# Patient Record
Sex: MALE | Race: BLACK OR AFRICAN AMERICAN | NOT HISPANIC OR LATINO
[De-identification: names, ages, dates, MRNs, and addresses within clinical notes are randomized per-mention and may not be internally consistent; named-entity substitution may affect disease eponyms.]

---

## 2019-12-02 ENCOUNTER — APPOINTMENT (OUTPATIENT)
Dept: HEART AND VASCULAR | Facility: CLINIC | Age: 69
End: 2019-12-02
Payer: MEDICARE

## 2019-12-02 ENCOUNTER — NON-APPOINTMENT (OUTPATIENT)
Age: 69
End: 2019-12-02

## 2019-12-02 VITALS
DIASTOLIC BLOOD PRESSURE: 80 MMHG | SYSTOLIC BLOOD PRESSURE: 145 MMHG | HEIGHT: 74 IN | BODY MASS INDEX: 27.72 KG/M2 | WEIGHT: 216 LBS

## 2019-12-02 DIAGNOSIS — E11.9 TYPE 2 DIABETES MELLITUS W/OUT COMPLICATIONS: ICD-10-CM

## 2019-12-02 PROCEDURE — 93308 TTE F-UP OR LMTD: CPT | Mod: 59

## 2019-12-02 PROCEDURE — 93321 DOPPLER ECHO F-UP/LMTD STD: CPT | Mod: 59

## 2019-12-02 PROCEDURE — 93000 ELECTROCARDIOGRAM COMPLETE: CPT

## 2019-12-02 PROCEDURE — 93325 DOPPLER ECHO COLOR FLOW MAPG: CPT | Mod: 59

## 2019-12-02 PROCEDURE — 93306 TTE W/DOPPLER COMPLETE: CPT

## 2019-12-02 PROCEDURE — 99205 OFFICE O/P NEW HI 60 MIN: CPT

## 2019-12-02 NOTE — HISTORY OF PRESENT ILLNESS
[FreeTextEntry1] : Patient is a 69-year-old black male with a history of diet-controlled diabetes mellitus who underwent what appears to be an arthroscopic knee procedure in March of 2018 which was complicated by postoperative atrial fibrillation patient was placed on a host of cardiac medications at that time including anticoagulation and heart failure medications which he took up until about July of this past year when he saw cardiologist locally who recommended him stopping those medications are he he enjoys generally pretty good exercise capacity walking regularly with no symptom limitations of chest pain shortness of breath arrhythmias syncope or near syncope. Patient today presents for second opinion\par Patient underwent a cardiac evaluation in in March of 2018 for the new onset of atrial fibrillation was found to have depressed LV function and underwent a cardiac catheterization which revealed an EF of 26-30% with no evidence of coronary artery disease no clear cardiology of the heart failure was identified

## 2019-12-02 NOTE — ASSESSMENT
[FreeTextEntry1] : Impression \par PAF with rate controlled on resting EKG \par LVEF appears depressed on echo 40-45% with 1-2 + MR \par will resume eliquis 5 bid form embolic protection and resume amio 400 bid x 7 days then 200 mg qd as maintenance dose\par I am assuming drop in EF is due to PAF as prior Cath revealed no CAD \par If Holter reveals rapid rates would add beta blocker \par Consider TEEECV if AF persists\par

## 2019-12-02 NOTE — PHYSICAL EXAM
[General Appearance - Well Developed] : well developed [Normal Appearance] : normal appearance [Well Groomed] : well groomed [General Appearance - Well Nourished] : well nourished [No Deformities] : no deformities [General Appearance - In No Acute Distress] : no acute distress [Normal Conjunctiva] : the conjunctiva exhibited no abnormalities [Eyelids - No Xanthelasma] : the eyelids demonstrated no xanthelasmas [Normal Oral Mucosa] : normal oral mucosa [No Oral Cyanosis] : no oral cyanosis [No Oral Pallor] : no oral pallor [Normal Jugular Venous A Waves Present] : normal jugular venous A waves present [Normal Jugular Venous V Waves Present] : normal jugular venous V waves present [No Jugular Venous Gaines A Waves] : no jugular venous gaines A waves [Heart Rate And Rhythm] : heart rate and rhythm were normal [Heart Sounds] : normal S1 and S2 [Murmurs] : no murmurs present [Respiration, Rhythm And Depth] : normal respiratory rhythm and effort [Auscultation Breath Sounds / Voice Sounds] : lungs were clear to auscultation bilaterally [Exaggerated Use Of Accessory Muscles For Inspiration] : no accessory muscle use [Abdomen Soft] : soft [Abdomen Tenderness] : non-tender [Abdomen Mass (___ Cm)] : no abdominal mass palpated [Abnormal Walk] : normal gait [Gait - Sufficient For Exercise Testing] : the gait was sufficient for exercise testing [Nail Clubbing] : no clubbing of the fingernails [Cyanosis, Localized] : no localized cyanosis [Petechial Hemorrhages (___cm)] : no petechial hemorrhages [] : no ischemic changes

## 2019-12-04 ENCOUNTER — APPOINTMENT (OUTPATIENT)
Dept: HEART AND VASCULAR | Facility: CLINIC | Age: 69
End: 2019-12-04

## 2020-01-09 ENCOUNTER — APPOINTMENT (OUTPATIENT)
Dept: HEART AND VASCULAR | Facility: CLINIC | Age: 70
End: 2020-01-09
Payer: MEDICARE

## 2020-01-09 ENCOUNTER — NON-APPOINTMENT (OUTPATIENT)
Age: 70
End: 2020-01-09

## 2020-01-09 VITALS
WEIGHT: 211 LBS | HEART RATE: 55 BPM | BODY MASS INDEX: 27.08 KG/M2 | SYSTOLIC BLOOD PRESSURE: 138 MMHG | HEIGHT: 74 IN | DIASTOLIC BLOOD PRESSURE: 82 MMHG

## 2020-01-09 PROCEDURE — 93000 ELECTROCARDIOGRAM COMPLETE: CPT

## 2020-01-09 PROCEDURE — 99214 OFFICE O/P EST MOD 30 MIN: CPT

## 2020-01-09 RX ORDER — AMIODARONE HYDROCHLORIDE 200 MG/1
200 TABLET ORAL
Qty: 28 | Refills: 0 | Status: DISCONTINUED | COMMUNITY
Start: 2019-12-02 | End: 2020-01-09

## 2020-01-09 RX ORDER — AMIODARONE HYDROCHLORIDE 200 MG/1
200 TABLET ORAL DAILY
Qty: 90 | Refills: 3 | Status: DISCONTINUED | COMMUNITY
Start: 2019-12-02 | End: 2020-01-09

## 2020-01-09 NOTE — PHYSICAL EXAM
[General Appearance - Well Developed] : well developed [Normal Appearance] : normal appearance [Well Groomed] : well groomed [General Appearance - Well Nourished] : well nourished [No Deformities] : no deformities [General Appearance - In No Acute Distress] : no acute distress [Normal Conjunctiva] : the conjunctiva exhibited no abnormalities [Eyelids - No Xanthelasma] : the eyelids demonstrated no xanthelasmas [Normal Oral Mucosa] : normal oral mucosa [No Oral Pallor] : no oral pallor [No Oral Cyanosis] : no oral cyanosis [Normal Jugular Venous A Waves Present] : normal jugular venous A waves present [Normal Jugular Venous V Waves Present] : normal jugular venous V waves present [No Jugular Venous Gaines A Waves] : no jugular venous gaines A waves [Respiration, Rhythm And Depth] : normal respiratory rhythm and effort [Exaggerated Use Of Accessory Muscles For Inspiration] : no accessory muscle use [Auscultation Breath Sounds / Voice Sounds] : lungs were clear to auscultation bilaterally [Irregularly Irregular] : irregularly irregular [Normal S1] : normal S1 [Normal S2] : normal S2 [II] : a grade 2 [Abdomen Soft] : soft [Abdomen Tenderness] : non-tender [Abdomen Mass (___ Cm)] : no abdominal mass palpated [Abnormal Walk] : normal gait [Gait - Sufficient For Exercise Testing] : the gait was sufficient for exercise testing [Nail Clubbing] : no clubbing of the fingernails [Cyanosis, Localized] : no localized cyanosis [Petechial Hemorrhages (___cm)] : no petechial hemorrhages [Skin Color & Pigmentation] : normal skin color and pigmentation [] : no rash [No Venous Stasis] : no venous stasis [Skin Lesions] : no skin lesions [No Skin Ulcers] : no skin ulcer [No Xanthoma] : no  xanthoma was observed

## 2020-01-09 NOTE — HISTORY OF PRESENT ILLNESS
[FreeTextEntry1] : Walks up to 2 mils at medium pace with no sob or palpitaions \par Has reduced LVEF on echo with chronic afib \par Holter revealsperiods of rapid rate (chronic afib)\par Afib has been chronic likley since 3/18 \par Wife notes heavy snoring and patient has daytime fatique

## 2020-01-09 NOTE — ASSESSMENT
[FreeTextEntry1] : Impression \par Chronic AFIB possiblly related to underlying myopathy or MARIUSZ ( heavy snorer) \par Low likelyhood of conversion with meds or CV and as patient is asymptomatic will stop amio and add coreg 12.5 bid for rate control and reduced LVEF \par Consider Sleep study to assess MARIUSZ \par Eliquis will continue at 5 mg bid

## 2020-05-21 ENCOUNTER — APPOINTMENT (OUTPATIENT)
Dept: HEART AND VASCULAR | Facility: CLINIC | Age: 70
End: 2020-05-21
Payer: MEDICARE

## 2020-05-21 ENCOUNTER — NON-APPOINTMENT (OUTPATIENT)
Age: 70
End: 2020-05-21

## 2020-05-21 VITALS — SYSTOLIC BLOOD PRESSURE: 125 MMHG | DIASTOLIC BLOOD PRESSURE: 80 MMHG

## 2020-05-21 VITALS — HEIGHT: 74 IN | BODY MASS INDEX: 26.95 KG/M2 | WEIGHT: 210 LBS

## 2020-05-21 PROCEDURE — 99214 OFFICE O/P EST MOD 30 MIN: CPT

## 2020-05-21 PROCEDURE — 93000 ELECTROCARDIOGRAM COMPLETE: CPT

## 2020-05-21 NOTE — HISTORY OF PRESENT ILLNESS
[FreeTextEntry1] : Walks up to 2 mils at medium pace with no sob or palpitaions \par Has reduced LVEF on echo with chronic afib \par Holter revealsperiods of rapid rate (chronic afib)\par Afib has been chronic likley since 3/18 \par Wife notes heavy snoring and patient has daytime fatique \par 5/21/2020\par no effort based symptoms compliant w meds no bleeding

## 2020-05-21 NOTE — PHYSICAL EXAM
[Normal Appearance] : normal appearance [General Appearance - Well Developed] : well developed [Well Groomed] : well groomed [General Appearance - Well Nourished] : well nourished [No Deformities] : no deformities [General Appearance - In No Acute Distress] : no acute distress [Normal Conjunctiva] : the conjunctiva exhibited no abnormalities [Eyelids - No Xanthelasma] : the eyelids demonstrated no xanthelasmas [Normal Oral Mucosa] : normal oral mucosa [No Oral Cyanosis] : no oral cyanosis [No Oral Pallor] : no oral pallor [Normal Jugular Venous V Waves Present] : normal jugular venous V waves present [Normal Jugular Venous A Waves Present] : normal jugular venous A waves present [No Jugular Venous Gaines A Waves] : no jugular venous gaines A waves [Respiration, Rhythm And Depth] : normal respiratory rhythm and effort [Exaggerated Use Of Accessory Muscles For Inspiration] : no accessory muscle use [Auscultation Breath Sounds / Voice Sounds] : lungs were clear to auscultation bilaterally [Abdomen Soft] : soft [Abdomen Tenderness] : non-tender [Abdomen Mass (___ Cm)] : no abdominal mass palpated [Abnormal Walk] : normal gait [Gait - Sufficient For Exercise Testing] : the gait was sufficient for exercise testing [Nail Clubbing] : no clubbing of the fingernails [Cyanosis, Localized] : no localized cyanosis [Petechial Hemorrhages (___cm)] : no petechial hemorrhages [Skin Color & Pigmentation] : normal skin color and pigmentation [] : no rash [No Venous Stasis] : no venous stasis [Skin Lesions] : no skin lesions [No Skin Ulcers] : no skin ulcer [Irregularly Irregular] : irregularly irregular [No Xanthoma] : no  xanthoma was observed [Normal S1] : normal S1 [Normal S2] : normal S2 [II] : a grade 2

## 2020-05-21 NOTE — ASSESSMENT
[FreeTextEntry1] : Impression \par Chronic AFIB possiblly related to underlying myopathy or MARIUSZ ( heavy snorer) \par Low likelihood of conversion with meds or CV and as patient is asymptomatic will stop amio and add coreg 12.5 bid for rate control and reduced LVEF \par Consider Sleep study to assess MARIUSZ \par Eliquis will continue at 5 mg bid

## 2020-12-03 ENCOUNTER — NON-APPOINTMENT (OUTPATIENT)
Age: 70
End: 2020-12-03

## 2020-12-03 ENCOUNTER — APPOINTMENT (OUTPATIENT)
Dept: HEART AND VASCULAR | Facility: CLINIC | Age: 70
End: 2020-12-03
Payer: MEDICARE

## 2020-12-03 VITALS
BODY MASS INDEX: 27.59 KG/M2 | HEIGHT: 74 IN | DIASTOLIC BLOOD PRESSURE: 82 MMHG | HEART RATE: 71 BPM | WEIGHT: 215 LBS | SYSTOLIC BLOOD PRESSURE: 130 MMHG

## 2020-12-03 PROCEDURE — 36415 COLL VENOUS BLD VENIPUNCTURE: CPT

## 2020-12-03 PROCEDURE — 93880 EXTRACRANIAL BILAT STUDY: CPT

## 2020-12-03 PROCEDURE — 99215 OFFICE O/P EST HI 40 MIN: CPT

## 2020-12-03 PROCEDURE — 93000 ELECTROCARDIOGRAM COMPLETE: CPT

## 2020-12-03 PROCEDURE — 93306 TTE W/DOPPLER COMPLETE: CPT

## 2020-12-03 NOTE — ASSESSMENT
[FreeTextEntry1] : Impression \par Lvef 40%  on echo GH Dilated LV \par will add ACE due to ext fatigue and CLAY \par Has been NON compliant with eliquis despite high risk of CVA in AFIB \par Will get ETT MIBI to quantify effort tolerance \par Carotid minimal disease \par Medical complaince urged \par declined Flu vaccine \par

## 2020-12-03 NOTE — PHYSICAL EXAM
[General Appearance - Well Developed] : well developed [Normal Appearance] : normal appearance [Well Groomed] : well groomed [General Appearance - Well Nourished] : well nourished [No Deformities] : no deformities [General Appearance - In No Acute Distress] : no acute distress [Normal Conjunctiva] : the conjunctiva exhibited no abnormalities [Eyelids - No Xanthelasma] : the eyelids demonstrated no xanthelasmas [Normal Oral Mucosa] : normal oral mucosa [No Oral Pallor] : no oral pallor [No Oral Cyanosis] : no oral cyanosis [Normal Jugular Venous A Waves Present] : normal jugular venous A waves present [Normal Jugular Venous V Waves Present] : normal jugular venous V waves present [No Jugular Venous Gaines A Waves] : no jugular venous gaines A waves [Respiration, Rhythm And Depth] : normal respiratory rhythm and effort [Exaggerated Use Of Accessory Muscles For Inspiration] : no accessory muscle use [Auscultation Breath Sounds / Voice Sounds] : lungs were clear to auscultation bilaterally [Abdomen Soft] : soft [Abdomen Tenderness] : non-tender [Abdomen Mass (___ Cm)] : no abdominal mass palpated [Abnormal Walk] : normal gait [Gait - Sufficient For Exercise Testing] : the gait was sufficient for exercise testing [Nail Clubbing] : no clubbing of the fingernails [Cyanosis, Localized] : no localized cyanosis [Petechial Hemorrhages (___cm)] : no petechial hemorrhages [Skin Color & Pigmentation] : normal skin color and pigmentation [] : no rash [No Venous Stasis] : no venous stasis [Skin Lesions] : no skin lesions [No Skin Ulcers] : no skin ulcer [No Xanthoma] : no  xanthoma was observed [Irregularly Irregular] : irregularly irregular [Normal S1] : normal S1 [Normal S2] : normal S2 [II] : a grade 2

## 2020-12-04 LAB
ANION GAP SERPL CALC-SCNC: 9 MMOL/L
BUN SERPL-MCNC: 17 MG/DL
CALCIUM SERPL-MCNC: 8.6 MG/DL
CHLORIDE SERPL-SCNC: 105 MMOL/L
CO2 SERPL-SCNC: 28 MMOL/L
CREAT SERPL-MCNC: 0.98 MG/DL
GLUCOSE SERPL-MCNC: 102 MG/DL
NT-PROBNP SERPL-MCNC: 1645 PG/ML
POTASSIUM SERPL-SCNC: 4.6 MMOL/L
SODIUM SERPL-SCNC: 142 MMOL/L

## 2020-12-21 ENCOUNTER — APPOINTMENT (OUTPATIENT)
Dept: HEART AND VASCULAR | Facility: CLINIC | Age: 70
End: 2020-12-21
Payer: MEDICARE

## 2020-12-21 VITALS
SYSTOLIC BLOOD PRESSURE: 150 MMHG | HEART RATE: 66 BPM | HEIGHT: 74 IN | BODY MASS INDEX: 27.59 KG/M2 | WEIGHT: 215 LBS | DIASTOLIC BLOOD PRESSURE: 80 MMHG

## 2020-12-21 PROCEDURE — 78452 HT MUSCLE IMAGE SPECT MULT: CPT

## 2020-12-21 PROCEDURE — 93015 CV STRESS TEST SUPVJ I&R: CPT

## 2020-12-21 PROCEDURE — A9500: CPT

## 2020-12-21 PROCEDURE — 96374 THER/PROPH/DIAG INJ IV PUSH: CPT | Mod: 59

## 2020-12-21 NOTE — PHYSICAL EXAM
[General Appearance - Well Developed] : well developed [Normal Appearance] : normal appearance [Well Groomed] : well groomed [General Appearance - Well Nourished] : well nourished [No Deformities] : no deformities [General Appearance - In No Acute Distress] : no acute distress [Normal Conjunctiva] : the conjunctiva exhibited no abnormalities [Eyelids - No Xanthelasma] : the eyelids demonstrated no xanthelasmas [Normal Oral Mucosa] : normal oral mucosa [No Oral Pallor] : no oral pallor [No Oral Cyanosis] : no oral cyanosis [Normal Jugular Venous A Waves Present] : normal jugular venous A waves present [Normal Jugular Venous V Waves Present] : normal jugular venous V waves present [No Jugular Venous Gaines A Waves] : no jugular venous gaines A waves [] : no respiratory distress [Respiration, Rhythm And Depth] : normal respiratory rhythm and effort [Exaggerated Use Of Accessory Muscles For Inspiration] : no accessory muscle use [Auscultation Breath Sounds / Voice Sounds] : lungs were clear to auscultation bilaterally [Heart Rate And Rhythm] : heart rate and rhythm were normal [Heart Sounds] : normal S1 and S2 [Murmurs] : no murmurs present

## 2020-12-30 ENCOUNTER — APPOINTMENT (OUTPATIENT)
Dept: HEART AND VASCULAR | Facility: CLINIC | Age: 70
End: 2020-12-30
Payer: MEDICARE

## 2020-12-30 ENCOUNTER — NON-APPOINTMENT (OUTPATIENT)
Age: 70
End: 2020-12-30

## 2020-12-30 VITALS
SYSTOLIC BLOOD PRESSURE: 150 MMHG | BODY MASS INDEX: 27.34 KG/M2 | HEART RATE: 78 BPM | DIASTOLIC BLOOD PRESSURE: 100 MMHG | HEIGHT: 74 IN | WEIGHT: 213 LBS

## 2020-12-30 PROCEDURE — 36415 COLL VENOUS BLD VENIPUNCTURE: CPT

## 2020-12-30 PROCEDURE — 93000 ELECTROCARDIOGRAM COMPLETE: CPT

## 2020-12-30 PROCEDURE — 99215 OFFICE O/P EST HI 40 MIN: CPT

## 2020-12-30 NOTE — HISTORY OF PRESENT ILLNESS
[FreeTextEntry1] : Seen here for PAF and non ischemic CM \par Recent stress tets LVEF41% with poor effrt tolerance and exaggerated BP respone \par Has taken up regular walking up to 3 miles on flat ground at brisk pace \par Compliant with BP and NOAC agents \par no orthopnea or pnd

## 2020-12-30 NOTE — ASSESSMENT
[FreeTextEntry1] : Impression \par Bp nees optimization \par inc altace to 10 mg \par add Aldactone \par f/u renal fx and K level \par has only been taking eliquis once per day qod \par despite my expressed concern about risk of CVA \par

## 2020-12-31 LAB
ANION GAP SERPL CALC-SCNC: 12 MMOL/L
BUN SERPL-MCNC: 13 MG/DL
CALCIUM SERPL-MCNC: 8.6 MG/DL
CHLORIDE SERPL-SCNC: 103 MMOL/L
CO2 SERPL-SCNC: 28 MMOL/L
CREAT SERPL-MCNC: 1.06 MG/DL
GLUCOSE SERPL-MCNC: 84 MG/DL
NT-PROBNP SERPL-MCNC: 2192 PG/ML
POTASSIUM SERPL-SCNC: 4.8 MMOL/L
SODIUM SERPL-SCNC: 143 MMOL/L

## 2021-02-24 ENCOUNTER — APPOINTMENT (OUTPATIENT)
Dept: HEART AND VASCULAR | Facility: CLINIC | Age: 71
End: 2021-02-24
Payer: MEDICARE

## 2021-02-24 ENCOUNTER — NON-APPOINTMENT (OUTPATIENT)
Age: 71
End: 2021-02-24

## 2021-02-24 VITALS — SYSTOLIC BLOOD PRESSURE: 140 MMHG | DIASTOLIC BLOOD PRESSURE: 80 MMHG

## 2021-02-24 VITALS
WEIGHT: 206 LBS | DIASTOLIC BLOOD PRESSURE: 90 MMHG | HEIGHT: 74 IN | BODY MASS INDEX: 26.44 KG/M2 | SYSTOLIC BLOOD PRESSURE: 150 MMHG

## 2021-02-24 PROCEDURE — 99214 OFFICE O/P EST MOD 30 MIN: CPT

## 2021-02-24 PROCEDURE — 36415 COLL VENOUS BLD VENIPUNCTURE: CPT

## 2021-02-24 PROCEDURE — 93000 ELECTROCARDIOGRAM COMPLETE: CPT

## 2021-02-24 NOTE — PHYSICAL EXAM
[General Appearance - Well Developed] : well developed [Normal Appearance] : normal appearance [Well Groomed] : well groomed [General Appearance - Well Nourished] : well nourished [No Deformities] : no deformities [General Appearance - In No Acute Distress] : no acute distress [Normal Conjunctiva] : the conjunctiva exhibited no abnormalities [Eyelids - No Xanthelasma] : the eyelids demonstrated no xanthelasmas [Normal Oral Mucosa] : normal oral mucosa [No Oral Pallor] : no oral pallor [No Oral Cyanosis] : no oral cyanosis [Normal Jugular Venous A Waves Present] : normal jugular venous A waves present [Normal Jugular Venous V Waves Present] : normal jugular venous V waves present [No Jugular Venous Gaines A Waves] : no jugular venous gaines A waves [Respiration, Rhythm And Depth] : normal respiratory rhythm and effort [Exaggerated Use Of Accessory Muscles For Inspiration] : no accessory muscle use [Auscultation Breath Sounds / Voice Sounds] : lungs were clear to auscultation bilaterally [Abdomen Tenderness] : non-tender [Abdomen Soft] : soft [Abdomen Mass (___ Cm)] : no abdominal mass palpated [Abnormal Walk] : normal gait [Gait - Sufficient For Exercise Testing] : the gait was sufficient for exercise testing [Nail Clubbing] : no clubbing of the fingernails [Cyanosis, Localized] : no localized cyanosis [Petechial Hemorrhages (___cm)] : no petechial hemorrhages [Skin Color & Pigmentation] : normal skin color and pigmentation [] : no rash [No Venous Stasis] : no venous stasis [Skin Lesions] : no skin lesions [No Skin Ulcers] : no skin ulcer [No Xanthoma] : no  xanthoma was observed [Irregularly Irregular] : irregularly irregular [Normal S1] : normal S1 [Normal S2] : normal S2 [II] : a grade 2

## 2021-02-24 NOTE — ASSESSMENT
[FreeTextEntry1] : Impression \par Bp improved on meds \par still taking eliquis qd NOT bid \par compliance urged\par Vaccin access discussed with pt and wife \par

## 2021-03-02 LAB
ANION GAP SERPL CALC-SCNC: 12 MMOL/L
BUN SERPL-MCNC: 18 MG/DL
CALCIUM SERPL-MCNC: 8.9 MG/DL
CHLORIDE SERPL-SCNC: 106 MMOL/L
CO2 SERPL-SCNC: 26 MMOL/L
CREAT SERPL-MCNC: 1.13 MG/DL
GLUCOSE SERPL-MCNC: 94 MG/DL
POTASSIUM SERPL-SCNC: 4.6 MMOL/L
SODIUM SERPL-SCNC: 144 MMOL/L

## 2021-03-18 LAB — NT-PROBNP SERPL-MCNC: 2475 PG/ML

## 2021-06-23 ENCOUNTER — NON-APPOINTMENT (OUTPATIENT)
Age: 71
End: 2021-06-23

## 2021-06-23 ENCOUNTER — APPOINTMENT (OUTPATIENT)
Dept: HEART AND VASCULAR | Facility: CLINIC | Age: 71
End: 2021-06-23
Payer: MEDICARE

## 2021-06-23 VITALS
BODY MASS INDEX: 26.69 KG/M2 | SYSTOLIC BLOOD PRESSURE: 120 MMHG | DIASTOLIC BLOOD PRESSURE: 70 MMHG | WEIGHT: 208 LBS | HEIGHT: 74 IN | HEART RATE: 85 BPM

## 2021-06-23 PROCEDURE — 99214 OFFICE O/P EST MOD 30 MIN: CPT

## 2021-06-23 PROCEDURE — 93000 ELECTROCARDIOGRAM COMPLETE: CPT

## 2021-06-23 NOTE — HISTORY OF PRESENT ILLNESS
[FreeTextEntry1] : Seen here for PAF and non ischemic CM \par Recent stress tetst LVEF41% with poor effrt tolerance and exaggerated BP respone \par Has taken up regular walking up to 3 miles on flat ground at brisk pace \par Compliant with BP and NOAC agents \par no orthopnea or pnd \par 2/24/2021\par Feells well with no effort intolerance no edema or PND \par no bleeding issues on NOAC agent \par 6/23/21\par Good effort tolerance no sob or arrhythmia or palpations \par no edema no syncope or near syncope

## 2021-06-23 NOTE — ASSESSMENT
[FreeTextEntry1] : Impression \par Bp improved on meds \par still taking eliquis qd NOT bid \par compliance urged\par \par

## 2021-08-12 ENCOUNTER — NON-APPOINTMENT (OUTPATIENT)
Age: 71
End: 2021-08-12

## 2021-08-12 ENCOUNTER — APPOINTMENT (OUTPATIENT)
Dept: HEART AND VASCULAR | Facility: CLINIC | Age: 71
End: 2021-08-12
Payer: MEDICARE

## 2021-08-12 VITALS
WEIGHT: 203 LBS | HEART RATE: 79 BPM | BODY MASS INDEX: 26.05 KG/M2 | SYSTOLIC BLOOD PRESSURE: 118 MMHG | DIASTOLIC BLOOD PRESSURE: 75 MMHG | HEIGHT: 74 IN

## 2021-08-12 VITALS — OXYGEN SATURATION: 98 %

## 2021-08-12 PROCEDURE — 99214 OFFICE O/P EST MOD 30 MIN: CPT

## 2021-08-12 PROCEDURE — 36415 COLL VENOUS BLD VENIPUNCTURE: CPT

## 2021-08-12 PROCEDURE — 93000 ELECTROCARDIOGRAM COMPLETE: CPT

## 2021-08-12 NOTE — HISTORY OF PRESENT ILLNESS
[FreeTextEntry1] : Seen by PMD for nasal congestion and observed by family to have sob \par No leg edema or wgt gain \par has some PND but patient says its due to nasal congestion\par Cxr by report neg for CHF or pneumonia\par

## 2021-08-12 NOTE — REVIEW OF SYSTEMS
[Headache] : headache [Feeling Fatigued] : feeling fatigued [Sinus Pressure] : sinus pressure [SOB] : shortness of breath [Dyspnea on exertion] : dyspnea during exertion [Negative] : Gastrointestinal [Lower Ext Edema] : no extremity edema [Leg Claudication] : no intermittent leg claudication [Syncope] : no syncope [Cough] : no cough

## 2021-08-12 NOTE — PHYSICAL EXAM
[Well Developed] : well developed [Well Nourished] : well nourished [No Acute Distress] : no acute distress [Normal Conjunctiva] : normal conjunctiva [Normal Venous Pressure] : normal venous pressure [No Carotid Bruit] : no carotid bruit [5th Left ICS - MCL] : palpated at the 5th LICS in the midclavicular line [Irregularly Irregular] : irregularly irregular [Normal S1] : normal S1 [Normal S2] : normal S2 [II] : a grade 2 [No Pitting Edema] : no pitting edema present [Clear Lung Fields] : clear lung fields [Good Air Entry] : good air entry [No Respiratory Distress] : no respiratory distress  [Soft] : abdomen soft [Non Tender] : non-tender [No Masses/organomegaly] : no masses/organomegaly [Normal Bowel Sounds] : normal bowel sounds [Normal Gait] : normal gait [No Edema] : no edema [No Cyanosis] : no cyanosis [No Clubbing] : no clubbing [No Varicosities] : no varicosities [No Rash] : no rash [No Skin Lesions] : no skin lesions [Moves all extremities] : moves all extremities [No Focal Deficits] : no focal deficits [Normal Speech] : normal speech [Alert and Oriented] : alert and oriented [Normal memory] : normal memory

## 2021-08-13 LAB — NT-PROBNP SERPL-MCNC: 3908 PG/ML

## 2021-12-14 ENCOUNTER — APPOINTMENT (OUTPATIENT)
Dept: HEART AND VASCULAR | Facility: CLINIC | Age: 71
End: 2021-12-14
Payer: MEDICARE

## 2021-12-14 ENCOUNTER — NON-APPOINTMENT (OUTPATIENT)
Age: 71
End: 2021-12-14

## 2021-12-14 VITALS
DIASTOLIC BLOOD PRESSURE: 80 MMHG | HEIGHT: 74 IN | SYSTOLIC BLOOD PRESSURE: 125 MMHG | WEIGHT: 200 LBS | HEART RATE: 101 BPM | BODY MASS INDEX: 25.67 KG/M2

## 2021-12-14 DIAGNOSIS — Z91.19 PATIENT'S NONCOMPLIANCE WITH OTHER MEDICAL TREATMENT AND REGIMEN: ICD-10-CM

## 2021-12-14 PROCEDURE — 99215 OFFICE O/P EST HI 40 MIN: CPT

## 2021-12-14 PROCEDURE — 93000 ELECTROCARDIOGRAM COMPLETE: CPT

## 2021-12-14 PROCEDURE — 93306 TTE W/DOPPLER COMPLETE: CPT

## 2021-12-14 PROCEDURE — ZZZZZ: CPT

## 2021-12-14 NOTE — ASSESSMENT
[FreeTextEntry1] : progressive sob w edema on exam c/w decompensated CHF \par has had mixed compliance w diuretics and ACE meds \par \par

## 2021-12-14 NOTE — HISTORY OF PRESENT ILLNESS
[FreeTextEntry1] : Seen by PMD for nasal congestion and observed by family to have sob \par worsening edema and sob no orthopnea or pnd \par no cough \par has periodic gerd pain \par poor compliance w meds \par has MARIUSZ based on sleep study is awaiting CPAP

## 2021-12-15 LAB
ANION GAP SERPL CALC-SCNC: 13 MMOL/L
BUN SERPL-MCNC: 32 MG/DL
CALCIUM SERPL-MCNC: 8.7 MG/DL
CHLORIDE SERPL-SCNC: 104 MMOL/L
CO2 SERPL-SCNC: 27 MMOL/L
CREAT SERPL-MCNC: 1.34 MG/DL
GLUCOSE SERPL-MCNC: 103 MG/DL
NT-PROBNP SERPL-MCNC: 7797 PG/ML
POTASSIUM SERPL-SCNC: 3.7 MMOL/L
SODIUM SERPL-SCNC: 144 MMOL/L

## 2021-12-28 ENCOUNTER — NON-APPOINTMENT (OUTPATIENT)
Age: 71
End: 2021-12-28

## 2021-12-28 ENCOUNTER — APPOINTMENT (OUTPATIENT)
Dept: HEART AND VASCULAR | Facility: CLINIC | Age: 71
End: 2021-12-28
Payer: MEDICARE

## 2021-12-28 VITALS
HEIGHT: 74 IN | DIASTOLIC BLOOD PRESSURE: 90 MMHG | WEIGHT: 187 LBS | HEART RATE: 97 BPM | BODY MASS INDEX: 24 KG/M2 | SYSTOLIC BLOOD PRESSURE: 130 MMHG

## 2021-12-28 PROCEDURE — 36415 COLL VENOUS BLD VENIPUNCTURE: CPT

## 2021-12-28 PROCEDURE — 99214 OFFICE O/P EST MOD 30 MIN: CPT

## 2021-12-28 PROCEDURE — 93000 ELECTROCARDIOGRAM COMPLETE: CPT

## 2021-12-28 RX ORDER — RAMIPRIL 10 MG/1
10 CAPSULE ORAL DAILY
Qty: 90 | Refills: 3 | Status: DISCONTINUED | COMMUNITY
Start: 2020-12-03 | End: 2021-12-28

## 2021-12-28 NOTE — ASSESSMENT
[FreeTextEntry1] : CHF improved on meds \par will add entresto in liue of ACE \par bnp pending w sma7 \par \par

## 2021-12-28 NOTE — HISTORY OF PRESENT ILLNESS
[FreeTextEntry1] : 12/14/21    Seen by PMD for nasal congestion and observed by family to have sob \par worsening edema and sob no orthopnea or pnd \par no cough \par has periodic gerd pain \par poor compliance w meds \par has MARIUSZ based on sleep study is awaiting CPAP\par 12/28/21\par  has diuresed 13 lbs with improvement in dypnea \par no edema at present

## 2021-12-28 NOTE — PHYSICAL EXAM
[Well Developed] : well developed [Well Nourished] : well nourished [No Acute Distress] : no acute distress [Normal Conjunctiva] : normal conjunctiva [Normal Venous Pressure] : normal venous pressure [No Carotid Bruit] : no carotid bruit [5th Left ICS - MCL] : palpated at the 5th LICS in the midclavicular line [Irregularly Irregular] : irregularly irregular [Normal S1] : normal S1 [Normal S2] : normal S2 [II] : a grade 2 [No Pitting Edema] : no pitting edema present [Clear Lung Fields] : clear lung fields [Good Air Entry] : good air entry [No Respiratory Distress] : no respiratory distress  [Soft] : abdomen soft [Non Tender] : non-tender [No Masses/organomegaly] : no masses/organomegaly [Normal Bowel Sounds] : normal bowel sounds [Normal Gait] : normal gait [No Cyanosis] : no cyanosis [No Clubbing] : no clubbing [No Varicosities] : no varicosities [No Rash] : no rash [No Skin Lesions] : no skin lesions [Moves all extremities] : moves all extremities [No Focal Deficits] : no focal deficits [Normal Speech] : normal speech [Alert and Oriented] : alert and oriented [Normal memory] : normal memory

## 2021-12-29 LAB
ANION GAP SERPL CALC-SCNC: 15 MMOL/L
BUN SERPL-MCNC: 27 MG/DL
CALCIUM SERPL-MCNC: 8.6 MG/DL
CHLORIDE SERPL-SCNC: 103 MMOL/L
CO2 SERPL-SCNC: 26 MMOL/L
CREAT SERPL-MCNC: 1.15 MG/DL
GLUCOSE SERPL-MCNC: 83 MG/DL
NT-PROBNP SERPL-MCNC: 5561 PG/ML
POTASSIUM SERPL-SCNC: 3.6 MMOL/L
SODIUM SERPL-SCNC: 144 MMOL/L

## 2022-02-28 ENCOUNTER — NON-APPOINTMENT (OUTPATIENT)
Age: 72
End: 2022-02-28

## 2022-02-28 ENCOUNTER — APPOINTMENT (OUTPATIENT)
Dept: HEART AND VASCULAR | Facility: CLINIC | Age: 72
End: 2022-02-28
Payer: MEDICARE

## 2022-02-28 VITALS
HEART RATE: 87 BPM | DIASTOLIC BLOOD PRESSURE: 85 MMHG | WEIGHT: 183 LBS | HEIGHT: 74 IN | SYSTOLIC BLOOD PRESSURE: 120 MMHG | BODY MASS INDEX: 23.49 KG/M2

## 2022-02-28 DIAGNOSIS — G47.33 OBSTRUCTIVE SLEEP APNEA (ADULT) (PEDIATRIC): ICD-10-CM

## 2022-02-28 PROCEDURE — 99214 OFFICE O/P EST MOD 30 MIN: CPT

## 2022-02-28 PROCEDURE — 36415 COLL VENOUS BLD VENIPUNCTURE: CPT

## 2022-02-28 PROCEDURE — 93000 ELECTROCARDIOGRAM COMPLETE: CPT

## 2022-02-28 NOTE — DISCUSSION/SUMMARY
[Atrial Fibrillation] : atrial fibrillation [Controlled Ventricular Response] : controlled ventricular response [Stable] : stable [Congestive Heart Failure] : congestive heart failure [Improving] : improving [Responding to Treatment] : responding to treatment [Basic Metabolic Panel] : basic metabolic panel [BNP] : B-type natriuretic peptide [ICD] : an implantable cardioverter-defibrillator [Patient] : the patient [With Me] : with me [___ Month(s)] : in [unfilled] month(s) [de-identified] : salt restriction avoid nsaids  [FreeTextEntry2] : spouse

## 2022-02-28 NOTE — HISTORY OF PRESENT ILLNESS
[FreeTextEntry1] : 12/14/21    Seen by PMD for nasal congestion and observed by family to have sob \par worsening edema and sob no orthopnea or pnd \par no cough \par has periodic gerd pain \par poor compliance w meds \par has MARIUSZ based on sleep study is awaiting CPAP\par 12/28/21\par  has diuresed 13 lbs with improvement in dypnea \par no edema at present \par 2/28/22\par feels better w entresto \par takes diurtetic qod \par better effort capacity no orthopnea or pnd

## 2022-03-01 LAB
ANION GAP SERPL CALC-SCNC: 12 MMOL/L
BUN SERPL-MCNC: 24 MG/DL
CALCIUM SERPL-MCNC: 8.7 MG/DL
CHLORIDE SERPL-SCNC: 106 MMOL/L
CO2 SERPL-SCNC: 27 MMOL/L
CREAT SERPL-MCNC: 1.1 MG/DL
EGFR: 72 ML/MIN/1.73M2
GLUCOSE SERPL-MCNC: 110 MG/DL
NT-PROBNP SERPL-MCNC: 6494 PG/ML
POTASSIUM SERPL-SCNC: 3.8 MMOL/L
SODIUM SERPL-SCNC: 145 MMOL/L

## 2022-04-07 ENCOUNTER — NON-APPOINTMENT (OUTPATIENT)
Age: 72
End: 2022-04-07

## 2022-05-24 ENCOUNTER — NON-APPOINTMENT (OUTPATIENT)
Age: 72
End: 2022-05-24

## 2022-05-24 ENCOUNTER — APPOINTMENT (OUTPATIENT)
Dept: HEART AND VASCULAR | Facility: CLINIC | Age: 72
End: 2022-05-24
Payer: MEDICARE

## 2022-05-24 VITALS
DIASTOLIC BLOOD PRESSURE: 70 MMHG | BODY MASS INDEX: 24.26 KG/M2 | WEIGHT: 189 LBS | HEART RATE: 78 BPM | SYSTOLIC BLOOD PRESSURE: 100 MMHG | HEIGHT: 74 IN

## 2022-05-24 VITALS — SYSTOLIC BLOOD PRESSURE: 120 MMHG | DIASTOLIC BLOOD PRESSURE: 80 MMHG

## 2022-05-24 PROCEDURE — 99214 OFFICE O/P EST MOD 30 MIN: CPT

## 2022-05-24 PROCEDURE — 93000 ELECTROCARDIOGRAM COMPLETE: CPT

## 2022-05-24 NOTE — DISCUSSION/SUMMARY
[Atrial Fibrillation] : atrial fibrillation [Controlled Ventricular Response] : controlled ventricular response [Stable] : stable [Congestive Heart Failure] : congestive heart failure [Improving] : improving [Responding to Treatment] : responding to treatment [Basic Metabolic Panel] : basic metabolic panel [BNP] : B-type natriuretic peptide [Continue] : continuing aspirin [Sodium Restriction] : sodium restriction [ICD] : an implantable cardioverter-defibrillator [Patient] : the patient [With Me] : with me [___ Month(s)] : in [unfilled] month(s) [de-identified] : stop eliquis 3 days preop  [de-identified] : salt restriction avoid nsaids  [FreeTextEntry2] : spouse

## 2022-05-24 NOTE — ASSESSMENT
[FreeTextEntry1] : \par \par Cleared for planned hernia reapir \par HOLD eliquis 6 doses prior to surgery

## 2022-05-24 NOTE — HISTORY OF PRESENT ILLNESS
[FreeTextEntry1] : 12/14/21    Seen by PMD for nasal congestion and observed by family to have sob \par worsening edema and sob no orthopnea or pnd \par no cough \par has periodic gerd pain \par poor compliance w meds \par has MARIUSZ based on sleep study is awaiting CPAP\par 12/28/21\par  has diuresed 13 lbs with improvement in dypnea \par no edema at present \par 2/28/22\par feels better w entresto \par takes diurtetic qod \par better effort capacity no orthopnea or pnd \par 5/23/22\par Patient is a 8-year-old male followed here for nonischemic cardiomyopathy as well as chronic atrial fibrillation he has been well maintained on standard heart failure medications including Aldactone diuretics Eliquis for stroke prophylaxis.  He is currently preop for a hernia repair.  He has had no recent symptoms of chest pain shortness of breath or leg edema\par wgt stable no orthopnea

## 2022-09-21 ENCOUNTER — APPOINTMENT (OUTPATIENT)
Dept: HEART AND VASCULAR | Facility: CLINIC | Age: 72
End: 2022-09-21

## 2022-09-21 ENCOUNTER — NON-APPOINTMENT (OUTPATIENT)
Age: 72
End: 2022-09-21

## 2022-09-21 VITALS
BODY MASS INDEX: 22.72 KG/M2 | DIASTOLIC BLOOD PRESSURE: 80 MMHG | HEIGHT: 74 IN | WEIGHT: 177 LBS | SYSTOLIC BLOOD PRESSURE: 122 MMHG | HEART RATE: 80 BPM

## 2022-09-21 VITALS
DIASTOLIC BLOOD PRESSURE: 81 MMHG | SYSTOLIC BLOOD PRESSURE: 120 MMHG | WEIGHT: 165 LBS | HEIGHT: 74 IN | BODY MASS INDEX: 21.17 KG/M2

## 2022-09-21 PROCEDURE — 99214 OFFICE O/P EST MOD 30 MIN: CPT

## 2022-09-21 PROCEDURE — 93000 ELECTROCARDIOGRAM COMPLETE: CPT

## 2022-09-21 NOTE — ASSESSMENT
[FreeTextEntry1] : \par Assessment \par CHF compensated on meds \par Has ahd excess wgt loss beyond just " water wgt " \par will rec speaking to PMD about CF and blood work \par Cleared for planned hernia reapir \par HOLD eliquis 2 days preop

## 2022-09-21 NOTE — HISTORY OF PRESENT ILLNESS
[FreeTextEntry1] : 12/14/21    Seen by PMD for nasal congestion and observed by family to have sob \par worsening edema and sob no orthopnea or pnd \par no cough \par has periodic gerd pain \par poor compliance w meds \par has MARIUSZ based on sleep study is awaiting CPAP\par 12/28/21\par  has diuresed 13 lbs with improvement in dypnea \par no edema at present \par 2/28/22\par feels better w entresto \par takes diurtetic qod \par better effort capacity no orthopnea or pnd \par 5/23/22\par Patient is a 8-year-old male followed here for nonischemic cardiomyopathy as well as chronic atrial fibrillation he has been well maintained on standard heart failure medications including Aldactone diuretics Eliquis for stroke prophylaxis.  He is currently preop for a hernia repair.  He has had no recent symptoms of chest pain shortness of breath or leg edema\par wgt stable no orthopnea \par 9/21/22\par good effort tolernce \par takes diuretics prn for edma \par has no sob or sscp\par is preop for planned hernia repair \par no edema

## 2022-09-22 LAB — NT-PROBNP SERPL-MCNC: ABNORMAL PG/ML

## 2023-01-03 ENCOUNTER — APPOINTMENT (OUTPATIENT)
Dept: HEART AND VASCULAR | Facility: CLINIC | Age: 73
End: 2023-01-03
Payer: MEDICARE

## 2023-01-18 NOTE — DISCUSSION/SUMMARY
[Atrial Fibrillation] : atrial fibrillation [Controlled Ventricular Response] : controlled ventricular response [Stable] : stable [Congestive Heart Failure] : congestive heart failure [Improving] : improving [Responding to Treatment] : responding to treatment [Basic Metabolic Panel] : basic metabolic panel [BNP] : B-type natriuretic peptide [Continue] : continuing aspirin [Sodium Restriction] : sodium restriction [ICD] : an implantable cardioverter-defibrillator [Patient] : the patient [With Me] : with me [___ Month(s)] : in [unfilled] month(s) [de-identified] : stop eliquis 3 days preop  [de-identified] : salt restriction avoid nsaids  [FreeTextEntry2] : spouse  [EKG obtained to assist in diagnosis and management of assessed problem(s)] : EKG obtained to assist in diagnosis and management of assessed problem(s) Tranexamic Acid Pregnancy And Lactation Text: It is unknown if this medication is safe during pregnancy or breast feeding.

## 2023-01-23 ENCOUNTER — NON-APPOINTMENT (OUTPATIENT)
Age: 73
End: 2023-01-23

## 2023-01-23 ENCOUNTER — APPOINTMENT (OUTPATIENT)
Dept: HEART AND VASCULAR | Facility: CLINIC | Age: 73
End: 2023-01-23
Payer: MEDICARE

## 2023-01-23 VITALS
HEIGHT: 74 IN | WEIGHT: 170 LBS | SYSTOLIC BLOOD PRESSURE: 130 MMHG | BODY MASS INDEX: 21.82 KG/M2 | HEART RATE: 87 BPM | DIASTOLIC BLOOD PRESSURE: 80 MMHG

## 2023-01-23 PROCEDURE — 99215 OFFICE O/P EST HI 40 MIN: CPT

## 2023-01-23 PROCEDURE — 93306 TTE W/DOPPLER COMPLETE: CPT

## 2023-01-23 PROCEDURE — 93000 ELECTROCARDIOGRAM COMPLETE: CPT

## 2023-01-24 LAB
ANION GAP SERPL CALC-SCNC: 10 MMOL/L
BUN SERPL-MCNC: 27 MG/DL
CALCIUM SERPL-MCNC: 9.1 MG/DL
CHLORIDE SERPL-SCNC: 107 MMOL/L
CO2 SERPL-SCNC: 28 MMOL/L
CREAT SERPL-MCNC: 0.96 MG/DL
EGFR: 84 ML/MIN/1.73M2
GLUCOSE SERPL-MCNC: 104 MG/DL
NT-PROBNP SERPL-MCNC: ABNORMAL PG/ML
POTASSIUM SERPL-SCNC: 4.4 MMOL/L
SODIUM SERPL-SCNC: 146 MMOL/L

## 2023-01-24 NOTE — ASSESSMENT
[FreeTextEntry1] : \par Assessment \par CHFdecompsated will increase torsemide to 20 qd \par Farxiga added\par f/u 4-6 week \par Has had excess wgt loss beyond just " water wgt " \par will rec speaking to PMD about CF and blood work \par Cleared for planned hernia reapir \par HOLD eliquis 2 days preop \par ICD discussed will decline for now \par Lvef 25% GH 2+ MR Dilated LA RA

## 2023-01-24 NOTE — DISCUSSION/SUMMARY
[Atrial Fibrillation] : atrial fibrillation [Controlled Ventricular Response] : controlled ventricular response [Stable] : stable [Congestive Heart Failure] : congestive heart failure [Responding to Treatment] : responding to treatment [Basic Metabolic Panel] : basic metabolic panel [BNP] : B-type natriuretic peptide [Sodium Restriction] : sodium restriction [ICD] : an implantable cardioverter-defibrillator [Patient] : the patient [With Me] : with me [___ Month(s)] : in [unfilled] month(s) [Decompensated] : decompensated [Echocardiogram] : echocardiogram [Increase] : increasing diuretics [Continue] : continuing aldosterone antagonists [EKG obtained to assist in diagnosis and management of assessed problem(s)] : EKG obtained to assist in diagnosis and management of assessed problem(s) [de-identified] : stop eliquis 3 days preop  [de-identified] : salt restriction avoid nsaids  [FreeTextEntry2] : spouse  [FreeTextEntry1] : Assessment \par CHF  mild decompensated  will increase torsemide to 20 qd \par Farxiga added daily wgts at home\par f/u 4-6 week \par Has had excess wgt loss beyond just " water wgt " \par will rec speaking to PMD about CF and blood work \par *****************************************Cleared for planned Colonoscopy *****************************\par HOLD eliquis 2 days preop \par ICD discussed will decline for now \par risk benefit of ICD explained to wife and patient

## 2023-01-24 NOTE — HISTORY OF PRESENT ILLNESS
[FreeTextEntry1] : 12/14/21    Seen by PMD for nasal congestion and observed by family to have sob \par worsening edema and sob no orthopnea or pnd \par no cough \par has periodic gerd pain \par poor compliance w meds \par has MARIUSZ based on sleep study is awaiting CPAP\par 12/28/21\par  has diuresed 13 lbs with improvement in dypnea \par no edema at present \par 2/28/22\par feels better w entresto \par takes diurtetic qod \par better effort capacity no orthopnea or pnd \par 5/23/22\par Patient is a 72-year-old male followed here for nonischemic cardiomyopathy as well as chronic atrial fibrillation he has been well maintained on standard heart failure medications including Aldactone diuretics Eliquis for stroke prophylaxis.  He is currently preop for a hernia repair.  He has had no recent symptoms of chest pain shortness of breath or leg edema\par wgt stable no orthopnea \par 9/21/22\par good effort tolernce \par takes diuretics prn for edma \par has no sob or sscp\par is preop for planned hernia repair \par no edema \par \par 1/23/23\par Class II dyspnea MILD edema noted no PND or orthopnea \par No angina or effort related chest pain \par was seen by GI Needs clearance for CF Dr perdomo

## 2023-02-22 ENCOUNTER — RX RENEWAL (OUTPATIENT)
Age: 73
End: 2023-02-22

## 2023-02-28 ENCOUNTER — APPOINTMENT (OUTPATIENT)
Dept: HEART AND VASCULAR | Facility: CLINIC | Age: 73
End: 2023-02-28

## 2023-05-08 ENCOUNTER — RX RENEWAL (OUTPATIENT)
Age: 73
End: 2023-05-08

## 2024-01-03 ENCOUNTER — RX RENEWAL (OUTPATIENT)
Age: 74
End: 2024-01-03

## 2024-01-24 ENCOUNTER — APPOINTMENT (OUTPATIENT)
Dept: HEART AND VASCULAR | Facility: CLINIC | Age: 74
End: 2024-01-24
Payer: MEDICARE

## 2024-01-24 ENCOUNTER — NON-APPOINTMENT (OUTPATIENT)
Age: 74
End: 2024-01-24

## 2024-01-24 VITALS
DIASTOLIC BLOOD PRESSURE: 78 MMHG | SYSTOLIC BLOOD PRESSURE: 128 MMHG | HEART RATE: 77 BPM | WEIGHT: 169 LBS | BODY MASS INDEX: 21.69 KG/M2 | HEIGHT: 74 IN

## 2024-01-24 PROCEDURE — 93000 ELECTROCARDIOGRAM COMPLETE: CPT

## 2024-01-24 PROCEDURE — 99215 OFFICE O/P EST HI 40 MIN: CPT

## 2024-01-24 PROCEDURE — 93306 TTE W/DOPPLER COMPLETE: CPT

## 2024-01-24 PROCEDURE — G2211 COMPLEX E/M VISIT ADD ON: CPT

## 2024-01-24 NOTE — END OF VISIT
[FreeTextEntry3] : All medical record entries made by the Scribe were at my, TK Thomason, direction and personally dictated by me on 01/24/2024 . I have reviewed the chart and agree that the record accurately reflects my personal performance of the history, physical exam, assessment and plan. I have also personally directed, reviewed, and agreed with the chart. [Time Spent: ___ minutes] : I have spent [unfilled] minutes of time on the encounter. [>50% of the face to face encounter time was spent on counseling and/or coordination of care for ___] : Greater than 50% of the face to face encounter time was spent on counseling and/or coordination of care for [unfilled]

## 2024-01-24 NOTE — PHYSICAL EXAM
[Well Nourished] : well nourished [No Acute Distress] : no acute distress [Normal Venous Pressure] : normal venous pressure [Premature Beats] : regular with premature beats [Normal S1] : normal S1 [Normal S2] : normal S2 [II] : a grade 2 [___ +] : [unfilled]U+ pitting edema to the right ankle [Soft] : abdomen soft [Hepatomegaly] : hepatomegaly [Normal] : moves all extremities, no focal deficits, normal speech [de-identified] : enlarged liver 7 cm  [de-identified] : LLE edema 1-2 + rle 1+

## 2024-01-24 NOTE — REVIEW OF SYSTEMS
[Feeling Fatigued] : feeling fatigued [Weight Loss (___ Lbs)] : [unfilled] ~Ulb weight loss [SOB] : shortness of breath [Dyspnea on exertion] : dyspnea during exertion [Earache] : no earache [Sore Throat] : no sore throat [Leg Claudication] : no intermittent leg claudication [Syncope] : no syncope

## 2024-01-24 NOTE — DISCUSSION/SUMMARY
[Permanent Atrial Fibrillation] : permanent atrial fibrillation [Stable] : stable [Cardiomyopathy] : cardiomyopathy [Deteriorating] : deteriorating [BNP] : B-type natriuretic peptide [ECG] : ECG [Echocardiogram] : echocardiogram [Continue] : continuing aldosterone antagonists [ICD] : an implantable cardioverter-defibrillator [Fluid Restriction] : fluid restriction [Patient] : the patient [Family] : the patient's family [FreeTextEntry1] : Decompensated CHF with eveidence of cachexia likely related to hepatic congestion and mesentrric congestion  has been non compliant with medication will ask to take torsemide 20 qd NOT tiw  start Faxiga a 5 mg qd  aldactone 25 qd  daily wgts  ICD rec as well for potential lethal VT  Lvef 22% glogal hypo  LA dilated 2-3 MR  [EKG obtained to assist in diagnosis and management of assessed problem(s)] : EKG obtained to assist in diagnosis and management of assessed problem(s)

## 2024-01-24 NOTE — HISTORY OF PRESENT ILLNESS
[FreeTextEntry1] : 12/14/21  Seen by PMD for nasal congestion and observed by family to have sob worsening edema and sob no orthopnea or pnd no cough has periodic gerd pain poor compliance w meds has MARIUSZ based on sleep study is awaiting CPAP  12/28/21 has diuresed 13 lbs with improvement in dypnea no edema at present  2/28/22 feels better w entresto takes diurtetic qod better effort capacity no orthopnea or pnd  5/23/22 Patient is a 72-year-old male followed here for nonischemic cardiomyopathy as well as chronic atrial fibrillation he has been well maintained on standard heart failure medications including Aldactone diuretics Eliquis for stroke prophylaxis. He is currently preop for a hernia repair. He has had no recent symptoms of chest pain shortness of breath or leg edema wgt stable no orthopnea  9/21/22 good effort tolernce takes diuretics prn for edma has no sob or sscp is preop for planned hernia repair no edema  1/23/23 Class II dyspnea MILD edema noted no PND or orthopnea No angina or effort related chest pain was seen by GI Needs clearance for CF Dr perdomo  1/24/24 was seen by GI CT abd notable for ascites and hepatic congestion  was not compliant w torsemide  had prior dyspnea when off torsemide  wgt 169 no real change from 2023  but down ovrer 1 m

## 2024-01-25 LAB
ANION GAP SERPL CALC-SCNC: 11 MMOL/L
BUN SERPL-MCNC: 21 MG/DL
CALCIUM SERPL-MCNC: 8.9 MG/DL
CHLORIDE SERPL-SCNC: 108 MMOL/L
CO2 SERPL-SCNC: 26 MMOL/L
CREAT SERPL-MCNC: 0.84 MG/DL
EGFR: 93 ML/MIN/1.73M2
GLUCOSE SERPL-MCNC: 86 MG/DL
NT-PROBNP SERPL-MCNC: 4531 PG/ML
POTASSIUM SERPL-SCNC: 5.4 MMOL/L
SODIUM SERPL-SCNC: 146 MMOL/L

## 2024-02-26 ENCOUNTER — APPOINTMENT (OUTPATIENT)
Dept: HEART AND VASCULAR | Facility: CLINIC | Age: 74
End: 2024-02-26
Payer: MEDICARE

## 2024-02-26 ENCOUNTER — NON-APPOINTMENT (OUTPATIENT)
Age: 74
End: 2024-02-26

## 2024-02-26 VITALS
BODY MASS INDEX: 21.69 KG/M2 | SYSTOLIC BLOOD PRESSURE: 122 MMHG | HEIGHT: 74 IN | DIASTOLIC BLOOD PRESSURE: 72 MMHG | WEIGHT: 169 LBS | HEART RATE: 70 BPM

## 2024-02-26 PROCEDURE — 93000 ELECTROCARDIOGRAM COMPLETE: CPT

## 2024-02-26 PROCEDURE — G2211 COMPLEX E/M VISIT ADD ON: CPT

## 2024-02-26 PROCEDURE — 99214 OFFICE O/P EST MOD 30 MIN: CPT

## 2024-02-26 NOTE — PHYSICAL EXAM
[Well Nourished] : well nourished [No Acute Distress] : no acute distress [Premature Beats] : regular with premature beats [Normal Venous Pressure] : normal venous pressure [Normal S1] : normal S1 [Normal S2] : normal S2 [II] : a grade 2 [___ +] : [unfilled]U+ pitting edema to the right ankle [Soft] : abdomen soft [Normal] : moves all extremities, no focal deficits, normal speech [Hepatomegaly] : hepatomegaly [de-identified] : LLE edema 1-2 + rle 1+

## 2024-02-26 NOTE — REVIEW OF SYSTEMS
[Weight Loss (___ Lbs)] : [unfilled] ~Ulb weight loss [Feeling Fatigued] : not feeling fatigued [Earache] : no earache [Sore Throat] : no sore throat [SOB] : no shortness of breath [Leg Claudication] : no intermittent leg claudication [Dyspnea on exertion] : not dyspnea during exertion [Syncope] : no syncope

## 2024-02-26 NOTE — HISTORY OF PRESENT ILLNESS
[FreeTextEntry1] : 12/14/21  Seen by PMD for nasal congestion and observed by family to have sob worsening edema and sob no orthopnea or pnd no cough has periodic gerd pain poor compliance w meds has MARIUSZ based on sleep study is awaiting CPAP  12/28/21 has diuresed 13 lbs with improvement in dypnea no edema at present  2/28/22 feels better w entresto takes diurtetic qod better effort capacity no orthopnea or pnd  5/23/22 Patient is a 72-year-old male followed here for nonischemic cardiomyopathy as well as chronic atrial fibrillation he has been well maintained on standard heart failure medications including Aldactone diuretics Eliquis for stroke prophylaxis. He is currently preop for a hernia repair. He has had no recent symptoms of chest pain shortness of breath or leg edema wgt stable no orthopnea  9/21/22 good effort tolernce takes diuretics prn for edma has no sob or sscp is preop for planned hernia repair no edema  1/23/23 Class II dyspnea MILD edema noted no PND or orthopnea No angina or effort related chest pain was seen by GI Needs clearance for CF Dr perdomo  1/24/24 was seen by GI CT abd notable for ascites and hepatic congestion  was not compliant w torsemide  had prior dyspnea when off torsemide  wgt 169 no real change from 2023  but down ovrer 1 m  2/26/24 was given farxiga and was compliant w meds  went to torsemide 20 TIW  had drop in BP w Torsemide on 20 BIW BPO still low Torsemide 10 BIW  had lost 6 lbs w torsemide dyspnea is improved  lewss sob w walking  declined ICVD on last visit

## 2024-02-26 NOTE — DISCUSSION/SUMMARY
[Permanent Atrial Fibrillation] : permanent atrial fibrillation [Stable] : stable [Cardiomyopathy] : cardiomyopathy [Deteriorating] : deteriorating [BNP] : B-type natriuretic peptide [ECG] : ECG [Echocardiogram] : echocardiogram [Continue] : continuing diuretics [ICD] : an implantable cardioverter-defibrillator [Fluid Restriction] : fluid restriction [Patient] : the patient [Family] : the patient's family [FreeTextEntry1] :  Patient has nonischemic cardiomyopathy with markedly reduced LV function he is somewhat improved from his last visit he is more compliant with medications although he has periods of hypotension his Coreg dose was lowered to 6.25 twice daily his Aldactone is at 12.5 once a day and he takes torsemide 10 twice a week along with daily Entresto.  We discussed at length the balance between fluid retention and hypotension and will remain adherence to current therapy to improve his symptomatology and avoid hypotension I asked his wife to carefully cleared carefully follow his weights and salt intake to avoid volume overload.  Again we raised the issue of defibrillator which she currently declines. [EKG obtained to assist in diagnosis and management of assessed problem(s)] : EKG obtained to assist in diagnosis and management of assessed problem(s)

## 2024-02-26 NOTE — END OF VISIT
[Time Spent: ___ minutes] : I have spent [unfilled] minutes of time on the encounter. [>50% of the face to face encounter time was spent on counseling and/or coordination of care for ___] : Greater than 50% of the face to face encounter time was spent on counseling and/or coordination of care for [unfilled] [FreeTextEntry3] : All medical record entries made by the Scribe were at my, TK Thomason, direction and personally dictated by me on 01/24/2024 . I have reviewed the chart and agree that the record accurately reflects my personal performance of the history, physical exam, assessment and plan. I have also personally directed, reviewed, and agreed with the chart.

## 2024-02-27 LAB
ANION GAP SERPL CALC-SCNC: 14 MMOL/L
BUN SERPL-MCNC: 23 MG/DL
CALCIUM SERPL-MCNC: 9.3 MG/DL
CHLORIDE SERPL-SCNC: 105 MMOL/L
CO2 SERPL-SCNC: 24 MMOL/L
CREAT SERPL-MCNC: 0.96 MG/DL
EGFR: 84 ML/MIN/1.73M2
GLUCOSE SERPL-MCNC: 76 MG/DL
NT-PROBNP SERPL-MCNC: 6380 PG/ML
POTASSIUM SERPL-SCNC: 4.7 MMOL/L
SODIUM SERPL-SCNC: 142 MMOL/L

## 2024-04-01 ENCOUNTER — APPOINTMENT (OUTPATIENT)
Dept: HEART AND VASCULAR | Facility: CLINIC | Age: 74
End: 2024-04-01
Payer: MEDICARE

## 2024-04-01 VITALS — DIASTOLIC BLOOD PRESSURE: 70 MMHG | SYSTOLIC BLOOD PRESSURE: 118 MMHG

## 2024-04-01 VITALS
HEART RATE: 63 BPM | WEIGHT: 175 LBS | HEIGHT: 74 IN | SYSTOLIC BLOOD PRESSURE: 136 MMHG | BODY MASS INDEX: 22.46 KG/M2 | DIASTOLIC BLOOD PRESSURE: 80 MMHG

## 2024-04-01 DIAGNOSIS — I48.0 PAROXYSMAL ATRIAL FIBRILLATION: ICD-10-CM

## 2024-04-01 PROCEDURE — 99215 OFFICE O/P EST HI 40 MIN: CPT

## 2024-04-01 PROCEDURE — G2211 COMPLEX E/M VISIT ADD ON: CPT

## 2024-04-01 PROCEDURE — 93000 ELECTROCARDIOGRAM COMPLETE: CPT

## 2024-04-01 NOTE — REVIEW OF SYSTEMS
[Weight Loss (___ Lbs)] : [unfilled] ~Ulb weight loss [Feeling Fatigued] : not feeling fatigued [Earache] : no earache [Sore Throat] : no sore throat [SOB] : no shortness of breath [Dyspnea on exertion] : not dyspnea during exertion [Leg Claudication] : no intermittent leg claudication [Syncope] : no syncope

## 2024-04-01 NOTE — DISCUSSION/SUMMARY
[Permanent Atrial Fibrillation] : permanent atrial fibrillation [Stable] : stable [Cardiomyopathy] : cardiomyopathy [Deteriorating] : deteriorating [BNP] : B-type natriuretic peptide [ECG] : ECG [Echocardiogram] : echocardiogram [Continue] : continuing aldosterone antagonists [ICD] : an implantable cardioverter-defibrillator [Fluid Restriction] : fluid restriction [Patient] : the patient [Family] : the patient's family [FreeTextEntry1] : Patient clinically is improved with improved class I symptoms of heart failure no signs of congestion are present at the current time chemistries on his last visit were well-controlled with no azotemia or hyperkalemia.  Patient does have periodic symptoms of postural hypotension and drop in blood pressure after using torsemide and will often skip the Farxiga dose.  For the next month I want to try him using the torsemide only on a as needed basis in the signs of congestion or and/or shortness of breath and using the Farxiga on a more regular basis as both a diuretic cardioprotective agents.  We will monitor his blood pressure I would maintain him on the beta-blocker as well as the Aldactone and repeat chemistries were taken today A-fib rate controlled was present on EKG patient will remain on Eliquis for embolic prophylaxis. significant complexity in med management  60 min spent in visit  [EKG obtained to assist in diagnosis and management of assessed problem(s)] : EKG obtained to assist in diagnosis and management of assessed problem(s)

## 2024-04-01 NOTE — PHYSICAL EXAM
[Well Nourished] : well nourished [No Acute Distress] : no acute distress [Normal Venous Pressure] : normal venous pressure [Premature Beats] : regular with premature beats [Normal S1] : normal S1 [Normal S2] : normal S2 [II] : a grade 2 [___ +] : [unfilled]U+ pitting edema to the right ankle [Soft] : abdomen soft [Hepatomegaly] : hepatomegaly [Normal] : moves all extremities, no focal deficits, normal speech [de-identified] : LLE edema 1-2 + rle 1+

## 2024-04-01 NOTE — END OF VISIT
[Time Spent: ___ minutes] : I have spent [unfilled] minutes of time on the encounter. [FreeTextEntry3] : All medical record entries made by the Scribe were at my, TK Thomason, direction and personally dictated by me on 01/24/2024 . I have reviewed the chart and agree that the record accurately reflects my personal performance of the history, physical exam, assessment and plan. I have also personally directed, reviewed, and agreed with the chart.

## 2024-04-01 NOTE — HISTORY OF PRESENT ILLNESS
[FreeTextEntry1] : 12/14/21  Seen by PMD for nasal congestion and observed by family to have sob worsening edema and sob no orthopnea or pnd no cough has periodic gerd pain poor compliance w meds has MARIUSZ based on sleep study is awaiting CPAP  12/28/21 has diuresed 13 lbs with improvement in dypnea no edema at present  2/28/22 feels better w entresto takes diurtetic qod better effort capacity no orthopnea or pnd  5/23/22 Patient is a 72-year-old male followed here for nonischemic cardiomyopathy as well as chronic atrial fibrillation he has been well maintained on standard heart failure medications including Aldactone diuretics Eliquis for stroke prophylaxis. He is currently preop for a hernia repair. He has had no recent symptoms of chest pain shortness of breath or leg edema wgt stable no orthopnea  9/21/22 good effort tolernce takes diuretics prn for edma has no sob or sscp is preop for planned hernia repair no edema  1/23/23 Class II dyspnea MILD edema noted no PND or orthopnea No angina or effort related chest pain was seen by GI Needs clearance for CF Dr perdomo  1/24/24 was seen by GI CT abd notable for ascites and hepatic congestion  was not compliant w torsemide  had prior dyspnea when off torsemide  wgt 169 no real change from 2023  but down ovrer 1 m   2/26/24 was given farxiga and was compliant w meds  went to torsemide 20 TIW  had drop in BP w Torsemide on 20 BIW BPO still low Torsemide 10 BIW  had lost 6 lbs w torsemide dyspnea is improved  lewss sob w walking  declined ICVD on last visit   4/1/24 walking about 2 mils at medium pace no sob  transient Hypotension one day after torsemide as low as 60-70 sys

## 2024-04-02 ENCOUNTER — NON-APPOINTMENT (OUTPATIENT)
Age: 74
End: 2024-04-02

## 2024-04-02 LAB
ANION GAP SERPL CALC-SCNC: 10 MMOL/L
BUN SERPL-MCNC: 21 MG/DL
CALCIUM SERPL-MCNC: 8.8 MG/DL
CHLORIDE SERPL-SCNC: 104 MMOL/L
CO2 SERPL-SCNC: 28 MMOL/L
CREAT SERPL-MCNC: 1.02 MG/DL
EGFR: 78 ML/MIN/1.73M2
GLUCOSE SERPL-MCNC: 91 MG/DL
NT-PROBNP SERPL-MCNC: 1743 PG/ML
POTASSIUM SERPL-SCNC: 4.4 MMOL/L
SODIUM SERPL-SCNC: 142 MMOL/L

## 2024-05-01 ENCOUNTER — NON-APPOINTMENT (OUTPATIENT)
Age: 74
End: 2024-05-01

## 2024-05-01 ENCOUNTER — APPOINTMENT (OUTPATIENT)
Dept: HEART AND VASCULAR | Facility: CLINIC | Age: 74
End: 2024-05-01
Payer: MEDICARE

## 2024-05-01 VITALS
DIASTOLIC BLOOD PRESSURE: 80 MMHG | WEIGHT: 178 LBS | HEIGHT: 74 IN | BODY MASS INDEX: 22.84 KG/M2 | SYSTOLIC BLOOD PRESSURE: 128 MMHG

## 2024-05-01 DIAGNOSIS — I10 ESSENTIAL (PRIMARY) HYPERTENSION: ICD-10-CM

## 2024-05-01 DIAGNOSIS — I43 CARDIOMYOPATHY IN DISEASES CLASSIFIED ELSEWHERE: ICD-10-CM

## 2024-05-01 PROCEDURE — 99214 OFFICE O/P EST MOD 30 MIN: CPT

## 2024-05-01 PROCEDURE — G2211 COMPLEX E/M VISIT ADD ON: CPT

## 2024-05-01 PROCEDURE — 93000 ELECTROCARDIOGRAM COMPLETE: CPT

## 2024-05-01 NOTE — PHYSICAL EXAM
[Well Nourished] : well nourished [No Acute Distress] : no acute distress [Normal Venous Pressure] : normal venous pressure [Premature Beats] : regular with premature beats [Normal S1] : normal S1 [Normal S2] : normal S2 [II] : a grade 2 [___ +] : [unfilled]U+ pitting edema to the right ankle [Soft] : abdomen soft [Hepatomegaly] : hepatomegaly [Normal] : moves all extremities, no focal deficits, normal speech [de-identified] : LLE edema 1-2 + rle 1+

## 2024-05-01 NOTE — HISTORY OF PRESENT ILLNESS
[FreeTextEntry1] : 12/14/21  Seen by PMD for nasal congestion and observed by family to have sob worsening edema and sob no orthopnea or pnd no cough has periodic gerd pain poor compliance w meds has MARIUSZ based on sleep study is awaiting CPAP  12/28/21 has diuresed 13 lbs with improvement in dypnea no edema at present  2/28/22 feels better w entresto takes diurtetic qod better effort capacity no orthopnea or pnd  5/23/22 Patient is a 72-year-old male followed here for nonischemic cardiomyopathy as well as chronic atrial fibrillation he has been well maintained on standard heart failure medications including Aldactone diuretics Eliquis for stroke prophylaxis. He is currently preop for a hernia repair. He has had no recent symptoms of chest pain shortness of breath or leg edema wgt stable no orthopnea  9/21/22 good effort tolernce takes diuretics prn for edma has no sob or sscp is preop for planned hernia repair no edema  1/23/23 Class II dyspnea MILD edema noted no PND or orthopnea No angina or effort related chest pain was seen by GI Needs clearance for CF Dr perdomo  1/24/24 was seen by GI CT abd notable for ascites and hepatic congestion  was not compliant w torsemide  had prior dyspnea when off torsemide  wgt 169 no real change from 2023  but down ovrer 1 m   2/26/24 was given farxiga and was compliant w meds  went to torsemide 20 TIW  had drop in BP w Torsemide on 20 BIW BPO still low Torsemide 10 BIW  had lost 6 lbs w torsemide dyspnea is improved  lewss sob w walking  declined ICVD on last visit   4/1/24 walking about 2 mils at medium pace no sob  transient Hypotension one day after torsemide as low as 60-70 sys 5/1/24  walks about 2 mils at good pace w no ceballos no pnd  no edema

## 2024-05-02 ENCOUNTER — NON-APPOINTMENT (OUTPATIENT)
Age: 74
End: 2024-05-02

## 2024-05-02 LAB
ALBUMIN SERPL ELPH-MCNC: 4.3 G/DL
ALP BLD-CCNC: 67 U/L
ALT SERPL-CCNC: 16 U/L
ANION GAP SERPL CALC-SCNC: 13 MMOL/L
AST SERPL-CCNC: 32 U/L
BILIRUB SERPL-MCNC: 1 MG/DL
BUN SERPL-MCNC: 20 MG/DL
CALCIUM SERPL-MCNC: 9.3 MG/DL
CHLORIDE SERPL-SCNC: 101 MMOL/L
CO2 SERPL-SCNC: 25 MMOL/L
CREAT SERPL-MCNC: 1.1 MG/DL
EGFR: 71 ML/MIN/1.73M2
GLUCOSE SERPL-MCNC: 87 MG/DL
NT-PROBNP SERPL-MCNC: 3091 PG/ML
POTASSIUM SERPL-SCNC: 5.3 MMOL/L
PROT SERPL-MCNC: 7.7 G/DL
SODIUM SERPL-SCNC: 139 MMOL/L

## 2024-05-16 ENCOUNTER — RX RENEWAL (OUTPATIENT)
Age: 74
End: 2024-05-16

## 2024-06-19 ENCOUNTER — APPOINTMENT (OUTPATIENT)
Dept: HEART AND VASCULAR | Facility: CLINIC | Age: 74
End: 2024-06-19
Payer: MEDICARE

## 2024-06-19 ENCOUNTER — NON-APPOINTMENT (OUTPATIENT)
Age: 74
End: 2024-06-19

## 2024-06-19 VITALS
SYSTOLIC BLOOD PRESSURE: 124 MMHG | HEIGHT: 74 IN | HEART RATE: 51 BPM | WEIGHT: 187 LBS | BODY MASS INDEX: 24 KG/M2 | DIASTOLIC BLOOD PRESSURE: 80 MMHG

## 2024-06-19 DIAGNOSIS — Z00.00 ENCOUNTER FOR GENERAL ADULT MEDICAL EXAMINATION W/OUT ABNORMAL FINDINGS: ICD-10-CM

## 2024-06-19 PROCEDURE — G2211 COMPLEX E/M VISIT ADD ON: CPT

## 2024-06-19 PROCEDURE — 93000 ELECTROCARDIOGRAM COMPLETE: CPT

## 2024-06-19 PROCEDURE — 99214 OFFICE O/P EST MOD 30 MIN: CPT

## 2024-06-19 RX ORDER — CARVEDILOL 6.25 MG/1
6.25 TABLET, FILM COATED ORAL TWICE DAILY
Qty: 180 | Refills: 3 | Status: ACTIVE | COMMUNITY
Start: 2020-01-09

## 2024-06-19 RX ORDER — TORSEMIDE 10 MG/1
10 TABLET ORAL
Qty: 60 | Refills: 3 | Status: ACTIVE | COMMUNITY
Start: 2021-08-13

## 2024-06-19 RX ORDER — APIXABAN 5 MG/1
5 TABLET, FILM COATED ORAL
Qty: 180 | Refills: 3 | Status: ACTIVE | COMMUNITY
Start: 2019-12-02

## 2024-06-19 RX ORDER — SPIRONOLACTONE 25 MG/1
25 TABLET ORAL
Qty: 90 | Refills: 3 | Status: ACTIVE | COMMUNITY
Start: 2020-12-30

## 2024-06-19 RX ORDER — DAPAGLIFLOZIN 5 MG/1
5 TABLET, FILM COATED ORAL DAILY
Qty: 90 | Refills: 6 | Status: ACTIVE | COMMUNITY
Start: 2023-01-23

## 2024-06-19 RX ORDER — SACUBITRIL AND VALSARTAN 24; 26 MG/1; MG/1
24-26 TABLET, FILM COATED ORAL
Qty: 180 | Refills: 0 | Status: ACTIVE | COMMUNITY
Start: 2021-12-28

## 2024-06-19 NOTE — PHYSICAL EXAM
[Normal Venous Pressure] : normal venous pressure [Well Nourished] : well nourished [No Acute Distress] : no acute distress [Premature Beats] : regular with premature beats [Normal S1] : normal S1 [Normal S2] : normal S2 [II] : a grade 2 [___ +] : [unfilled]U+ pitting edema to the right ankle [Soft] : abdomen soft [Hepatomegaly] : hepatomegaly [Normal] : moves all extremities, no focal deficits, normal speech [de-identified] : NO JVP  [de-identified] : bno rales no pesacral edema  [de-identified] : trace edema

## 2024-06-19 NOTE — DISCUSSION/SUMMARY
[Permanent Atrial Fibrillation] : permanent atrial fibrillation [Stable] : stable [Cardiomyopathy] : cardiomyopathy [Deteriorating] : deteriorating [BNP] : B-type natriuretic peptide [ECG] : ECG [Echocardiogram] : echocardiogram [Continue] : continuing aldosterone antagonists [ICD] : an implantable cardioverter-defibrillator [Fluid Restriction] : fluid restriction [Patient] : the patient [Family] : the patient's family [FreeTextEntry1] : Patient clinically is improved with improved class I symptoms of heart failure no signs of congestion are present at the current time chemistries on his last visit were well-controlled with no azotemia or hyperkalemia.  Patient does have periodic symptoms of postural hypotension and drop in blood pressure after using torsemide and will often skip the Farxiga dose.  For the next month I want to try him using the torsemide only on a as needed basis in the signs of congestion or and/or shortness of breath and using the Farxiga on a more regular basis as both a diuretic cardioprotective agents.  We will monitor his blood pressure I would maintain him on the beta-blocker as well as the Aldactone and repeat chemistries were taken today A-fib rate controlled was present on EKG patient will remain on Eliquis for embolic prophylaxis. significant complexity in med management   [EKG obtained to assist in diagnosis and management of assessed problem(s)] : EKG obtained to assist in diagnosis and management of assessed problem(s)

## 2024-06-19 NOTE — REVIEW OF SYSTEMS
[Weight Loss (___ Lbs)] : [unfilled] ~Ulb weight loss [Weight Gain (___ Lbs)] : [unfilled] ~Ulb weight gain [Feeling Fatigued] : not feeling fatigued [Blurry Vision] : no blurred vision [Earache] : no earache [Sore Throat] : no sore throat [SOB] : no shortness of breath [Dyspnea on exertion] : not dyspnea during exertion [Leg Claudication] : no intermittent leg claudication [Syncope] : no syncope [Dizziness] : no dizziness [Convulsions] : no convulsions [Limb Weakness (Paresis)] : no limb weakness (Paresis) [Negative] : Integumentary [FreeTextEntry5] : trace edema

## 2024-06-19 NOTE — HISTORY OF PRESENT ILLNESS
[FreeTextEntry1] : 12/14/21  Seen by PMD for nasal congestion and observed by family to have sob worsening edema and sob no orthopnea or pnd no cough has periodic gerd pain poor compliance w meds has MARIUSZ based on sleep study is awaiting CPAP  12/28/21 has diuresed 13 lbs with improvement in dypnea no edema at present  2/28/22 feels better w entresto takes diurtetic qod better effort capacity no orthopnea or pnd  5/23/22 Patient is a 72-year-old male followed here for nonischemic cardiomyopathy as well as chronic atrial fibrillation he has been well maintained on standard heart failure medications including Aldactone diuretics Eliquis for stroke prophylaxis. He is currently preop for a hernia repair. He has had no recent symptoms of chest pain shortness of breath or leg edema wgt stable no orthopnea  9/21/22 good effort tolernce takes diuretics prn for edma has no sob or sscp is preop for planned hernia repair no edema  1/23/23 Class II dyspnea MILD edema noted no PND or orthopnea No angina or effort related chest pain was seen by GI Needs clearance for CF Dr perdomo  1/24/24 was seen by GI CT abd notable for ascites and hepatic congestion  was not compliant w torsemide  had prior dyspnea when off torsemide  wgt 169 no real change from 2023  but down ovrer 1 m   2/26/24 was given farxiga and was compliant w meds  went to torsemide 20 TIW  had drop in BP w Torsemide on 20 BIW BPO still low Torsemide 10 BIW  had lost 6 lbs w torsemide dyspnea is improved  lewss sob w walking  declined ICVD on last visit   4/1/24 walking about 2 mils at medium pace no sob  transient Hypotension one day after torsemide as low as 60-70 sys  5/1/24  walks about 2 mils at good pace w no ceballos no pnd  no edema   6/19/24 good effort capacity walks 2 miles  no pnd or sob minimal edema

## 2024-06-19 NOTE — PHYSICAL EXAM
[Normal Venous Pressure] : normal venous pressure [Well Nourished] : well nourished [No Acute Distress] : no acute distress [Premature Beats] : regular with premature beats [Normal S1] : normal S1 [Normal S2] : normal S2 [II] : a grade 2 [___ +] : [unfilled]U+ pitting edema to the right ankle [Soft] : abdomen soft [Hepatomegaly] : hepatomegaly [Normal] : moves all extremities, no focal deficits, normal speech [de-identified] : NO JVP  [de-identified] : bno rales no pesacral edema  [de-identified] : trace edema

## 2024-06-20 ENCOUNTER — NON-APPOINTMENT (OUTPATIENT)
Age: 74
End: 2024-06-20

## 2024-06-20 LAB
ANION GAP SERPL CALC-SCNC: 11 MMOL/L
BUN SERPL-MCNC: 22 MG/DL
CALCIUM SERPL-MCNC: 9 MG/DL
CHLORIDE SERPL-SCNC: 105 MMOL/L
CO2 SERPL-SCNC: 25 MMOL/L
CREAT SERPL-MCNC: 1.02 MG/DL
EGFR: 78 ML/MIN/1.73M2
GLUCOSE SERPL-MCNC: 91 MG/DL
NT-PROBNP SERPL-MCNC: 1610 PG/ML
POTASSIUM SERPL-SCNC: 4.8 MMOL/L
SODIUM SERPL-SCNC: 141 MMOL/L

## 2024-08-20 ENCOUNTER — NON-APPOINTMENT (OUTPATIENT)
Age: 74
End: 2024-08-20

## 2024-08-20 ENCOUNTER — APPOINTMENT (OUTPATIENT)
Dept: HEART AND VASCULAR | Facility: CLINIC | Age: 74
End: 2024-08-20
Payer: MEDICARE

## 2024-08-20 VITALS
DIASTOLIC BLOOD PRESSURE: 80 MMHG | HEART RATE: 49 BPM | BODY MASS INDEX: 23.87 KG/M2 | SYSTOLIC BLOOD PRESSURE: 120 MMHG | HEIGHT: 74 IN | WEIGHT: 186 LBS

## 2024-08-20 DIAGNOSIS — Z00.00 ENCOUNTER FOR GENERAL ADULT MEDICAL EXAMINATION W/OUT ABNORMAL FINDINGS: ICD-10-CM

## 2024-08-20 DIAGNOSIS — I48.0 PAROXYSMAL ATRIAL FIBRILLATION: ICD-10-CM

## 2024-08-20 PROCEDURE — 99214 OFFICE O/P EST MOD 30 MIN: CPT

## 2024-08-20 PROCEDURE — 93000 ELECTROCARDIOGRAM COMPLETE: CPT

## 2024-08-20 PROCEDURE — G2211 COMPLEX E/M VISIT ADD ON: CPT

## 2024-08-20 NOTE — PHYSICAL EXAM
[Well Nourished] : well nourished [No Acute Distress] : no acute distress [Normal Venous Pressure] : normal venous pressure [Premature Beats] : regular with premature beats [Normal S1] : normal S1 [Normal S2] : normal S2 [II] : a grade 2 [___ +] : [unfilled]U+ pitting edema to the right ankle [Soft] : abdomen soft [Hepatomegaly] : hepatomegaly [Normal] : moves all extremities, no focal deficits, normal speech [de-identified] : LLE edema 1-2 + rle 1+

## 2024-08-20 NOTE — HISTORY OF PRESENT ILLNESS
[FreeTextEntry1] : 12/14/21  Seen by PMD for nasal congestion and observed by family to have sob worsening edema and sob no orthopnea or pnd no cough has periodic gerd pain poor compliance w meds has MARIUSZ based on sleep study is awaiting CPAP  12/28/21 has diuresed 13 lbs with improvement in dypnea no edema at present  2/28/22 feels better w entresto takes diurtetic qod better effort capacity no orthopnea or pnd  5/23/22 Patient is a 72-year-old male followed here for nonischemic cardiomyopathy as well as chronic atrial fibrillation he has been well maintained on standard heart failure medications including Aldactone diuretics Eliquis for stroke prophylaxis. He is currently preop for a hernia repair. He has had no recent symptoms of chest pain shortness of breath or leg edema wgt stable no orthopnea  9/21/22 good effort tolernce takes diuretics prn for edma has no sob or sscp is preop for planned hernia repair no edema  1/23/23 Class II dyspnea MILD edema noted no PND or orthopnea No angina or effort related chest pain was seen by GI Needs clearance for CF Dr perdomo  1/24/24 was seen by GI CT abd notable for ascites and hepatic congestion  was not compliant w torsemide  had prior dyspnea when off torsemide  wgt 169 no real change from 2023  but down ovrer 1 m   2/26/24 was given farxiga and was compliant w meds  went to torsemide 20 TIW  had drop in BP w Torsemide on 20 BIW BPO still low Torsemide 10 BIW  had lost 6 lbs w torsemide dyspnea is improved  lewss sob w walking  declined ICVD on last visit   4/1/24 walking about 2 mils at medium pace no sob  transient Hypotension one day after torsemide as low as 60-70 sys  5/1/24  walks about 2 mils at good pace w no ceballos no pnd  no edema   6/19/24 feel well  08/20/24 goofd effort capacity no edema compliant w meds  no pnd  no orthopnea  has nocturia 2-3 x nite

## 2024-08-20 NOTE — ADDENDUM
[FreeTextEntry1] : Leif Beal assisted in documentation on 08/20/24 acting as a scribe for Dr. Roel Gutierrez.

## 2024-08-20 NOTE — DISCUSSION/SUMMARY
[Permanent Atrial Fibrillation] : permanent atrial fibrillation [Stable] : stable [Cardiomyopathy] : cardiomyopathy [Deteriorating] : deteriorating [BNP] : B-type natriuretic peptide [ECG] : ECG [Echocardiogram] : echocardiogram [Continue] : continuing aldosterone antagonists [ICD] : an implantable cardioverter-defibrillator [Fluid Restriction] : fluid restriction [Patient] : the patient [Family] : the patient's family [EKG obtained to assist in diagnosis and management of assessed problem(s)] : EKG obtained to assist in diagnosis and management of assessed problem(s) [FreeTextEntry1] : CHF compensation on med  AFIB rate controlled

## 2024-08-21 LAB
ANION GAP SERPL CALC-SCNC: 14 MMOL/L
BUN SERPL-MCNC: 22 MG/DL
CALCIUM SERPL-MCNC: 8.8 MG/DL
CHLORIDE SERPL-SCNC: 101 MMOL/L
CO2 SERPL-SCNC: 24 MMOL/L
CREAT SERPL-MCNC: 1 MG/DL
EGFR: 80 ML/MIN/1.73M2
GLUCOSE SERPL-MCNC: 92 MG/DL
NT-PROBNP SERPL-MCNC: 1869 PG/ML
POTASSIUM SERPL-SCNC: 4.4 MMOL/L
SODIUM SERPL-SCNC: 139 MMOL/L

## 2024-10-22 ENCOUNTER — NON-APPOINTMENT (OUTPATIENT)
Age: 74
End: 2024-10-22

## 2024-10-22 ENCOUNTER — APPOINTMENT (OUTPATIENT)
Dept: HEART AND VASCULAR | Facility: CLINIC | Age: 74
End: 2024-10-22
Payer: MEDICARE

## 2024-10-22 VITALS
DIASTOLIC BLOOD PRESSURE: 82 MMHG | BODY MASS INDEX: 23.74 KG/M2 | WEIGHT: 185 LBS | HEIGHT: 74 IN | HEART RATE: 59 BPM | SYSTOLIC BLOOD PRESSURE: 130 MMHG

## 2024-10-22 DIAGNOSIS — Z00.00 ENCOUNTER FOR GENERAL ADULT MEDICAL EXAMINATION W/OUT ABNORMAL FINDINGS: ICD-10-CM

## 2024-10-22 DIAGNOSIS — I43 CARDIOMYOPATHY IN DISEASES CLASSIFIED ELSEWHERE: ICD-10-CM

## 2024-10-22 DIAGNOSIS — I48.0 PAROXYSMAL ATRIAL FIBRILLATION: ICD-10-CM

## 2024-10-22 PROCEDURE — 99214 OFFICE O/P EST MOD 30 MIN: CPT

## 2024-10-22 PROCEDURE — G2211 COMPLEX E/M VISIT ADD ON: CPT

## 2024-10-22 PROCEDURE — 93000 ELECTROCARDIOGRAM COMPLETE: CPT

## 2024-10-23 LAB
ANION GAP SERPL CALC-SCNC: 13 MMOL/L
BUN SERPL-MCNC: 25 MG/DL
CALCIUM SERPL-MCNC: 9 MG/DL
CHLORIDE SERPL-SCNC: 110 MMOL/L
CO2 SERPL-SCNC: 25 MMOL/L
CREAT SERPL-MCNC: 1.27 MG/DL
EGFR: 60 ML/MIN/1.73M2
GLUCOSE SERPL-MCNC: 99 MG/DL
MAGNESIUM SERPL-MCNC: 2 MG/DL
NT-PROBNP SERPL-MCNC: 3191 PG/ML
POTASSIUM SERPL-SCNC: 4.3 MMOL/L
SODIUM SERPL-SCNC: 147 MMOL/L

## 2025-01-28 ENCOUNTER — NON-APPOINTMENT (OUTPATIENT)
Age: 75
End: 2025-01-28

## 2025-01-28 ENCOUNTER — APPOINTMENT (OUTPATIENT)
Dept: HEART AND VASCULAR | Facility: CLINIC | Age: 75
End: 2025-01-28

## 2025-01-28 VITALS
HEIGHT: 74 IN | HEART RATE: 65 BPM | BODY MASS INDEX: 24.38 KG/M2 | SYSTOLIC BLOOD PRESSURE: 130 MMHG | DIASTOLIC BLOOD PRESSURE: 88 MMHG | WEIGHT: 190 LBS

## 2025-01-28 DIAGNOSIS — I43 CARDIOMYOPATHY IN DISEASES CLASSIFIED ELSEWHERE: ICD-10-CM

## 2025-01-28 DIAGNOSIS — I48.0 PAROXYSMAL ATRIAL FIBRILLATION: ICD-10-CM

## 2025-01-28 PROCEDURE — 93306 TTE W/DOPPLER COMPLETE: CPT

## 2025-01-28 PROCEDURE — G2211 COMPLEX E/M VISIT ADD ON: CPT

## 2025-01-28 PROCEDURE — 93000 ELECTROCARDIOGRAM COMPLETE: CPT

## 2025-01-28 PROCEDURE — 99214 OFFICE O/P EST MOD 30 MIN: CPT

## 2025-05-06 ENCOUNTER — NON-APPOINTMENT (OUTPATIENT)
Age: 75
End: 2025-05-06

## 2025-05-06 ENCOUNTER — APPOINTMENT (OUTPATIENT)
Dept: HEART AND VASCULAR | Facility: CLINIC | Age: 75
End: 2025-05-06
Payer: MEDICARE

## 2025-05-06 VITALS
SYSTOLIC BLOOD PRESSURE: 126 MMHG | WEIGHT: 192 LBS | DIASTOLIC BLOOD PRESSURE: 85 MMHG | HEIGHT: 74 IN | HEART RATE: 61 BPM | BODY MASS INDEX: 24.64 KG/M2

## 2025-05-06 DIAGNOSIS — I48.0 PAROXYSMAL ATRIAL FIBRILLATION: ICD-10-CM

## 2025-05-06 DIAGNOSIS — I10 ESSENTIAL (PRIMARY) HYPERTENSION: ICD-10-CM

## 2025-05-06 DIAGNOSIS — Z00.00 ENCOUNTER FOR GENERAL ADULT MEDICAL EXAMINATION W/OUT ABNORMAL FINDINGS: ICD-10-CM

## 2025-05-06 DIAGNOSIS — I43 CARDIOMYOPATHY IN DISEASES CLASSIFIED ELSEWHERE: ICD-10-CM

## 2025-05-06 PROCEDURE — 99214 OFFICE O/P EST MOD 30 MIN: CPT

## 2025-05-06 PROCEDURE — G2211 COMPLEX E/M VISIT ADD ON: CPT

## 2025-05-06 PROCEDURE — 93000 ELECTROCARDIOGRAM COMPLETE: CPT

## 2025-05-07 LAB
ANION GAP SERPL CALC-SCNC: 14 MMOL/L
BUN SERPL-MCNC: 27 MG/DL
CALCIUM SERPL-MCNC: 8.9 MG/DL
CHLORIDE SERPL-SCNC: 105 MMOL/L
CO2 SERPL-SCNC: 23 MMOL/L
CREAT SERPL-MCNC: 1 MG/DL
EGFRCR SERPLBLD CKD-EPI 2021: 79 ML/MIN/1.73M2
GLUCOSE SERPL-MCNC: 85 MG/DL
NT-PROBNP SERPL-MCNC: 1067 PG/ML
POTASSIUM SERPL-SCNC: 4.3 MMOL/L
SODIUM SERPL-SCNC: 142 MMOL/L

## 2025-05-23 ENCOUNTER — RX RENEWAL (OUTPATIENT)
Age: 75
End: 2025-05-23

## 2025-07-02 ENCOUNTER — RX RENEWAL (OUTPATIENT)
Age: 75
End: 2025-07-02

## 2025-08-14 ENCOUNTER — NON-APPOINTMENT (OUTPATIENT)
Age: 75
End: 2025-08-14